# Patient Record
Sex: MALE | Race: AMERICAN INDIAN OR ALASKA NATIVE
[De-identification: names, ages, dates, MRNs, and addresses within clinical notes are randomized per-mention and may not be internally consistent; named-entity substitution may affect disease eponyms.]

---

## 2019-01-11 ENCOUNTER — HOSPITAL ENCOUNTER (EMERGENCY)
Dept: HOSPITAL 5 - ED | Age: 24
Discharge: HOME | End: 2019-01-11
Payer: COMMERCIAL

## 2019-01-11 VITALS — DIASTOLIC BLOOD PRESSURE: 73 MMHG | SYSTOLIC BLOOD PRESSURE: 115 MMHG

## 2019-01-11 DIAGNOSIS — R55: ICD-10-CM

## 2019-01-11 DIAGNOSIS — Z88.0: ICD-10-CM

## 2019-01-11 DIAGNOSIS — E86.0: Primary | ICD-10-CM

## 2019-01-11 DIAGNOSIS — N30.00: ICD-10-CM

## 2019-01-11 LAB
BASOPHILS # (AUTO): 0 K/MM3 (ref 0–0.1)
BASOPHILS NFR BLD AUTO: 0.3 % (ref 0–1.8)
BILIRUB UR QL STRIP: (no result)
BLOOD UR QL VISUAL: (no result)
BUN SERPL-MCNC: 8 MG/DL (ref 9–20)
BUN/CREAT SERPL: 7 %
CALCIUM SERPL-MCNC: 9.2 MG/DL (ref 8.4–10.2)
EOSINOPHIL # BLD AUTO: 0 K/MM3 (ref 0–0.4)
EOSINOPHIL NFR BLD AUTO: 0.1 % (ref 0–4.3)
HCT VFR BLD CALC: 38.6 % (ref 35.5–45.6)
HEMOLYSIS INDEX: 18
HGB BLD-MCNC: 11.9 GM/DL (ref 11.8–15.2)
LYMPHOCYTES # BLD AUTO: 0.9 K/MM3 (ref 1.2–5.4)
LYMPHOCYTES NFR BLD AUTO: 9.3 % (ref 13.4–35)
MCHC RBC AUTO-ENTMCNC: 31 % (ref 32–34)
MCV RBC AUTO: 75 FL (ref 84–94)
MONOCYTES # (AUTO): 0.5 K/MM3 (ref 0–0.8)
MONOCYTES % (AUTO): 4.8 % (ref 0–7.3)
MUCOUS THREADS #/AREA URNS HPF: (no result) /HPF
PH UR STRIP: 7 [PH] (ref 5–7)
PLATELET # BLD: 267 K/MM3 (ref 140–440)
RBC # BLD AUTO: 5.14 M/MM3 (ref 3.65–5.03)
RBC #/AREA URNS HPF: 4 /HPF (ref 0–6)
UROBILINOGEN UR-MCNC: < 2 MG/DL (ref ?–2)
WBC #/AREA URNS HPF: 16 /HPF (ref 0–6)

## 2019-01-11 PROCEDURE — 80048 BASIC METABOLIC PNL TOTAL CA: CPT

## 2019-01-11 PROCEDURE — 99284 EMERGENCY DEPT VISIT MOD MDM: CPT

## 2019-01-11 PROCEDURE — 93010 ELECTROCARDIOGRAM REPORT: CPT

## 2019-01-11 PROCEDURE — 84443 ASSAY THYROID STIM HORMONE: CPT

## 2019-01-11 PROCEDURE — 96360 HYDRATION IV INFUSION INIT: CPT

## 2019-01-11 PROCEDURE — 85025 COMPLETE CBC W/AUTO DIFF WBC: CPT

## 2019-01-11 PROCEDURE — 93005 ELECTROCARDIOGRAM TRACING: CPT

## 2019-01-11 PROCEDURE — 36415 COLL VENOUS BLD VENIPUNCTURE: CPT

## 2019-01-11 PROCEDURE — 81001 URINALYSIS AUTO W/SCOPE: CPT

## 2019-01-11 NOTE — EMERGENCY DEPARTMENT REPORT
HPI





- General


Chief Complaint: Weakness


Time Seen by Provider: 01/11/19 16:00





- HPI


HPI: 


23-year-old -American male presents to the emergency department via EMS 

with a complaint of some dizziness, generalized weakness, and near syncope.  The

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 

obstacle course.  He says that he finished the obstacle course but afterwards 

started feeling very weak and nauseated.  He says that he collapsed but did not 

pass out.  He was found to have an initial heart rate of 140.  He was given a 

liter of fluid and Zofran 4 mg in route.  Patient says he currently feels much 

better.  He denies any past medical history.








ED Past Medical Hx





- Past Medical History


Previous Medical History?: No





- Surgical History


Past Surgical History?: No





- Social History


Smoking Status: Never Smoker


Substance Use Type: None





- Medications


Home Medications: 


                                Home Medications











 Medication  Instructions  Recorded  Confirmed  Last Taken  Type


 


Nitrofurantoin Monohyd/M-Cryst 100 mg PO BID #12 capsule 01/11/19  Unknown Rx





[Macrobid 100 mg Capsule]     














ED Review of Systems


ROS: 


Stated complaint: SYNCOPE


Other details as noted in HPI





Comment: All other systems reviewed and negative


Constitutional: weakness.  denies: fever


Eyes: denies: eye pain, vision change


ENT: denies: ear pain, throat pain


Respiratory: denies: cough, shortness of breath


Cardiovascular: denies: chest pain, palpitations


Gastrointestinal: denies: abdominal pain, vomiting


Genitourinary: denies: dysuria, frequency


Musculoskeletal: denies: back pain, arthralgia


Skin: denies: rash, lesions


Neurological: other (dizziness).  denies: headache, numbness





Physical Exam





- Physical Exam


Vital Signs: 


                                   Vital Signs











  01/11/19





  15:53


 


Temperature 98.7 F


 


Pulse Rate 138 H


 


Respiratory 18





Rate 


 


Blood Pressure 133/58


 


O2 Sat by Pulse 95





Oximetry 











Physical Exam: 





GENERAL: The patient is well-developed well-nourished.


HEENT: Normocephalic.  Atraumatic.   Patient has moist mucous membranes.


EYES:  Extraocular motions are intact.  Pupils are equal and reactive to light 

bilaterally.


NECK: Supple.  Trachea is midline.


CHEST/LUNGS: Clear to auscultation.  There is no respiratory distress noted.  


HEART/CARDIOVASCULAR: Regular.  There is moderate tachycardia.  There is no 

obvious murmur.


ABDOMEN: Abdomen is soft, nontender.  Patient has normal bowel sounds.  There is

no abdominal distention.


SKIN: Skin is warm and dry.


NEURO: The patient is awake, alert, and oriented.  The patient is cooperative.  

The patient has no focal neurologic deficits.  The patient has normal speech. CN

II - XII grossly intact. 


MUSCULOSKELETAL: There is no tenderness or deformity.  There is no limitation 

range of motion.  There is no evidence of acute injury.





ED Course


                                   Vital Signs











  01/11/19





  15:53


 


Temperature 98.7 F


 


Pulse Rate 138 H


 


Respiratory 18





Rate 


 


Blood Pressure 133/58


 


O2 Sat by Pulse 95





Oximetry 














ED Medical Decision Making





- Lab Data


Result diagrams: 


                                 01/11/19 16:01





                                 01/11/19 16:01





- EKG Data


-: EKG Interpreted by Me


EKG shows normal: sinus rhythm, axis, intervals, QRS complexes, ST-T waves


Rate: tachycardia (114 bpm)





- EKG Data


When compared to previous EKG there are: previous EKG unavailable


Interpretation: other (sinus tachycardia.  No ST elevation MI)





- Medical Decision Making


Patient presents after getting tachycardic, dizzy/weak and nauseated after 

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 

department.  This was the first day of the patient's training.  Since being in 

the emergency department he has been awake and alert and in no acute distress.  

He presents with some tachycardia with heart rate of about 135 bpm but otherwise

he has no complaints at this time.  He denies any chest pain, palpitations, back

pain or shortness of breath.  EKG shows some sinus tachycardia.  No signs of any

ST elevation MI or dysrhythmia.  Labs were unremarkable including a CBC, BMP and

thyroid level.  Urinalysis showed a small urinary tract infection with 16 white 

blood cells and small leukocyte esterase and the patient will be treated with 

some Macrobid.  He was reevaluated multiple times overnight hours and has 

remained stable and his heart rate is coming down to a more reasonable level.  

Patient denies any illicit drug use.  He denies any recent travel, recent 

surgery or immobility.  He appears low suspicion for a pulmonary embolism as the

source of his tachycardia or previous symptoms.  He has been given referrals for

primary care for follow-up and he has been instructed to return to the emergency

department with any return or worsening of his symptoms, any acute distress.








- Differential Diagnosis


dysrhythmia, dehydration, substance abuse, heat exhaustion


Critical Care Time: No


Critical care attestation.: 


If time is entered above; I have spent that time in minutes in the direct care 

of this critically ill patient, excluding procedure time.








ED Disposition


Clinical Impression: 


 Near syncope, Dehydration





UTI (urinary tract infection)


Qualifiers:


 Urinary tract infection type: acute cystitis Hematuria presence: without he

maturia Qualified Code(s): N30.00 - Acute cystitis without hematuria





Disposition: DC-01 TO HOME OR SELFCARE


Is pt being admited?: No


Condition: Stable


Instructions:  Dehydration (ED), Urinary Tract Infection in Men (ED), Near 

Syncope (ED)


Additional Instructions: 


Please follow up with a primary care physician in the next few days.  Return to 

the emergency Department with any worsening of her symptoms or any acute 

distress.


Prescriptions: 


Nitrofurantoin Monohyd/M-Cryst [Macrobid 100 mg Capsule] 100 mg PO BID #12 capsu

le


Referrals: 


PRIMARY CARE,MD [Primary Care Provider] - 3-5 Days


JUNIOR JONES MD [Staff Physician] - 3-5 Days


[facility] [Outside] - 3-5 Days


Time of Disposition: 17:46